# Patient Record
Sex: FEMALE | Race: WHITE | NOT HISPANIC OR LATINO | URBAN - METROPOLITAN AREA
[De-identification: names, ages, dates, MRNs, and addresses within clinical notes are randomized per-mention and may not be internally consistent; named-entity substitution may affect disease eponyms.]

---

## 2015-10-17 RX ORDER — ASPIRIN/CALCIUM CARB/MAGNESIUM 324 MG
2 TABLET ORAL
Qty: 0 | Refills: 0 | COMMUNITY
Start: 2015-10-17

## 2017-03-16 ENCOUNTER — EMERGENCY (EMERGENCY)
Facility: HOSPITAL | Age: 67
LOS: 1 days | Discharge: HOME | End: 2017-03-16
Attending: EMERGENCY MEDICINE | Admitting: EMERGENCY MEDICINE
Payer: MEDICARE

## 2017-03-16 VITALS
TEMPERATURE: 98 F | RESPIRATION RATE: 16 BRPM | DIASTOLIC BLOOD PRESSURE: 80 MMHG | HEART RATE: 89 BPM | OXYGEN SATURATION: 97 % | SYSTOLIC BLOOD PRESSURE: 138 MMHG

## 2017-03-16 VITALS
DIASTOLIC BLOOD PRESSURE: 90 MMHG | HEIGHT: 62 IN | RESPIRATION RATE: 16 BRPM | HEART RATE: 103 BPM | TEMPERATURE: 98 F | SYSTOLIC BLOOD PRESSURE: 178 MMHG | WEIGHT: 115.08 LBS | OXYGEN SATURATION: 99 %

## 2017-03-16 DIAGNOSIS — J45.909 UNSPECIFIED ASTHMA, UNCOMPLICATED: ICD-10-CM

## 2017-03-16 DIAGNOSIS — D24.2 BENIGN NEOPLASM OF LEFT BREAST: Chronic | ICD-10-CM

## 2017-03-16 DIAGNOSIS — M79.662 PAIN IN LEFT LOWER LEG: ICD-10-CM

## 2017-03-16 DIAGNOSIS — Z79.82 LONG TERM (CURRENT) USE OF ASPIRIN: ICD-10-CM

## 2017-03-16 DIAGNOSIS — E78.5 HYPERLIPIDEMIA, UNSPECIFIED: ICD-10-CM

## 2017-03-16 DIAGNOSIS — Z90.710 ACQUIRED ABSENCE OF BOTH CERVIX AND UTERUS: Chronic | ICD-10-CM

## 2017-03-16 DIAGNOSIS — G47.33 OBSTRUCTIVE SLEEP APNEA (ADULT) (PEDIATRIC): ICD-10-CM

## 2017-03-16 DIAGNOSIS — I10 ESSENTIAL (PRIMARY) HYPERTENSION: ICD-10-CM

## 2017-03-16 PROCEDURE — 93971 EXTREMITY STUDY: CPT

## 2017-03-16 PROCEDURE — 93971 EXTREMITY STUDY: CPT | Mod: 26,LT

## 2017-03-16 PROCEDURE — 99284 EMERGENCY DEPT VISIT MOD MDM: CPT

## 2017-03-16 PROCEDURE — 99284 EMERGENCY DEPT VISIT MOD MDM: CPT | Mod: 25

## 2017-03-16 NOTE — ED ADULT TRIAGE NOTE - CHIEF COMPLAINT QUOTE
"I've had swelling down my left calf to my foot since yesterday. It's hot but it's not red. I've been taking aspirin" "I've had swelling down my left calf to my foot since yesterday. It's hot and painful but it's not red. I've been taking aspirin"

## 2017-03-16 NOTE — ED ADULT NURSE NOTE - CHIEF COMPLAINT QUOTE
"I've had swelling down my left calf to my foot since yesterday. It's hot but it's not red. I've been taking aspirin"

## 2017-03-16 NOTE — ED PROVIDER NOTE - EXTREMITY EXAM
no deformity, pain or tenderness, no restriction of movement no deformity, pain or tenderness, no restriction of movement/left lower extremity findings

## 2017-03-16 NOTE — ED ADULT NURSE NOTE - OBJECTIVE STATEMENT
Pt is C/O swelling, pain, warm & intermittent numbness to left calf beginning yesterday. Pt denies known injury or Hx DVT. No erythema noted. Pt reports taking 650 mg ASA last night & this AM

## 2017-03-16 NOTE — ED ADULT NURSE NOTE - PMH
Asthma  last attack 3 weeks ago. Gets attack once per month  HLD (hyperlipidemia)    HTN (hypertension)    Osteoarthrosis  right shoulder  Sleep apnea, obstructive  uses mouth guard  Tumor cells, benign  uterine

## 2017-03-16 NOTE — ED PROVIDER NOTE - ATTENDING CONTRIBUTION TO CARE
65 yo F p/w L calf pain x past ~ 2 days. No numbness / focal weakness. NO known trauma. No redness / swelling. no rad to upper leg or back. No cp/sob/palp. NO neck or back discomfort. No color changes. No other inj or co.  Exam : mild tend to L calf, no edema. No erythema. 2+ pulses. nl cap refill. Nl dist str/sens equal bl. nl rest of exam.  US with no acute, outpt fu with pmd

## 2017-03-16 NOTE — ED ADULT NURSE NOTE - PSH
Fibroadenoma of breast, left  fibroadenoma removal  S/P hysterectomy with oophorectomy  ovarian cyst

## 2017-04-14 ENCOUNTER — TRANSCRIPTION ENCOUNTER (OUTPATIENT)
Age: 67
End: 2017-04-14

## 2017-09-27 ENCOUNTER — TRANSCRIPTION ENCOUNTER (OUTPATIENT)
Age: 67
End: 2017-09-27

## 2017-09-27 ENCOUNTER — OUTPATIENT (OUTPATIENT)
Dept: OUTPATIENT SERVICES | Facility: HOSPITAL | Age: 67
LOS: 1 days | Discharge: HOME | End: 2017-09-27
Payer: MEDICARE

## 2017-09-27 ENCOUNTER — RESULT REVIEW (OUTPATIENT)
Age: 67
End: 2017-09-27

## 2017-09-27 DIAGNOSIS — D24.2 BENIGN NEOPLASM OF LEFT BREAST: Chronic | ICD-10-CM

## 2017-09-27 DIAGNOSIS — Z90.710 ACQUIRED ABSENCE OF BOTH CERVIX AND UTERUS: Chronic | ICD-10-CM

## 2017-09-27 DIAGNOSIS — Z12.11 ENCOUNTER FOR SCREENING FOR MALIGNANT NEOPLASM OF COLON: ICD-10-CM

## 2017-09-27 PROCEDURE — 88305 TISSUE EXAM BY PATHOLOGIST: CPT

## 2017-09-27 PROCEDURE — 45381 COLONOSCOPY SUBMUCOUS NJX: CPT | Mod: PT

## 2017-09-27 PROCEDURE — 88305 TISSUE EXAM BY PATHOLOGIST: CPT | Mod: 26

## 2017-09-27 PROCEDURE — 45380 COLONOSCOPY AND BIOPSY: CPT | Mod: PT

## 2017-09-28 LAB — SURGICAL PATHOLOGY FINAL REPORT - CH: SIGNIFICANT CHANGE UP

## 2017-09-30 DIAGNOSIS — Z12.11 ENCOUNTER FOR SCREENING FOR MALIGNANT NEOPLASM OF COLON: ICD-10-CM

## 2017-09-30 DIAGNOSIS — J45.909 UNSPECIFIED ASTHMA, UNCOMPLICATED: ICD-10-CM

## 2017-09-30 DIAGNOSIS — E78.00 PURE HYPERCHOLESTEROLEMIA, UNSPECIFIED: ICD-10-CM

## 2017-09-30 DIAGNOSIS — K64.8 OTHER HEMORRHOIDS: ICD-10-CM

## 2017-09-30 DIAGNOSIS — I10 ESSENTIAL (PRIMARY) HYPERTENSION: ICD-10-CM

## 2017-09-30 DIAGNOSIS — G47.33 OBSTRUCTIVE SLEEP APNEA (ADULT) (PEDIATRIC): ICD-10-CM

## 2017-09-30 DIAGNOSIS — D12.0 BENIGN NEOPLASM OF CECUM: ICD-10-CM

## 2017-09-30 DIAGNOSIS — K57.30 DIVERTICULOSIS OF LARGE INTESTINE WITHOUT PERFORATION OR ABSCESS WITHOUT BLEEDING: ICD-10-CM

## 2017-12-21 ENCOUNTER — TRANSCRIPTION ENCOUNTER (OUTPATIENT)
Age: 67
End: 2017-12-21

## 2018-04-24 ENCOUNTER — TRANSCRIPTION ENCOUNTER (OUTPATIENT)
Age: 68
End: 2018-04-24

## 2019-12-09 ENCOUNTER — TRANSCRIPTION ENCOUNTER (OUTPATIENT)
Age: 69
End: 2019-12-09

## 2019-12-10 ENCOUNTER — OUTPATIENT (OUTPATIENT)
Dept: OUTPATIENT SERVICES | Facility: HOSPITAL | Age: 69
LOS: 1 days | End: 2019-12-10
Payer: MEDICARE

## 2019-12-10 ENCOUNTER — RESULT REVIEW (OUTPATIENT)
Age: 69
End: 2019-12-10

## 2019-12-10 DIAGNOSIS — Z86.010 PERSONAL HISTORY OF COLONIC POLYPS: ICD-10-CM

## 2019-12-10 DIAGNOSIS — D24.2 BENIGN NEOPLASM OF LEFT BREAST: Chronic | ICD-10-CM

## 2019-12-10 DIAGNOSIS — Z90.710 ACQUIRED ABSENCE OF BOTH CERVIX AND UTERUS: Chronic | ICD-10-CM

## 2019-12-10 PROCEDURE — 45385 COLONOSCOPY W/LESION REMOVAL: CPT | Mod: PT

## 2019-12-10 PROCEDURE — C1889: CPT

## 2019-12-10 PROCEDURE — 88305 TISSUE EXAM BY PATHOLOGIST: CPT | Mod: 26

## 2019-12-10 PROCEDURE — 88305 TISSUE EXAM BY PATHOLOGIST: CPT

## 2019-12-11 LAB — SURGICAL PATHOLOGY STUDY: SIGNIFICANT CHANGE UP

## 2021-03-16 NOTE — ED ADULT NURSE NOTE - BREATH SOUNDS, MLM
Clear Glycopyrrolate Counseling:  I discussed with the patient the risks of glycopyrrolate including but not limited to skin rash, drowsiness, dry mouth, difficulty urinating, and blurred vision.

## 2021-08-17 ENCOUNTER — OUTPATIENT (OUTPATIENT)
Dept: OUTPATIENT SERVICES | Facility: HOSPITAL | Age: 71
LOS: 1 days | End: 2021-08-17
Payer: MEDICARE

## 2021-08-17 ENCOUNTER — APPOINTMENT (OUTPATIENT)
Dept: ULTRASOUND IMAGING | Facility: CLINIC | Age: 71
End: 2021-08-17
Payer: MEDICARE

## 2021-08-17 DIAGNOSIS — Z90.710 ACQUIRED ABSENCE OF BOTH CERVIX AND UTERUS: Chronic | ICD-10-CM

## 2021-08-17 DIAGNOSIS — D24.2 BENIGN NEOPLASM OF LEFT BREAST: Chronic | ICD-10-CM

## 2021-08-17 DIAGNOSIS — Z00.8 ENCOUNTER FOR OTHER GENERAL EXAMINATION: ICD-10-CM

## 2021-08-17 PROCEDURE — 76770 US EXAM ABDO BACK WALL COMP: CPT | Mod: 26

## 2021-08-17 PROCEDURE — 76770 US EXAM ABDO BACK WALL COMP: CPT

## 2022-08-04 ENCOUNTER — NON-APPOINTMENT (OUTPATIENT)
Age: 72
End: 2022-08-04

## 2023-04-18 ENCOUNTER — APPOINTMENT (OUTPATIENT)
Dept: OTOLARYNGOLOGY | Facility: CLINIC | Age: 73
End: 2023-04-18
Payer: MEDICARE

## 2023-04-18 VITALS
DIASTOLIC BLOOD PRESSURE: 76 MMHG | SYSTOLIC BLOOD PRESSURE: 166 MMHG | WEIGHT: 106 LBS | HEART RATE: 101 BPM | BODY MASS INDEX: 19.51 KG/M2 | HEIGHT: 62 IN

## 2023-04-18 DIAGNOSIS — J34.2 DEVIATED NASAL SEPTUM: ICD-10-CM

## 2023-04-18 DIAGNOSIS — J38.2 NODULES OF VOCAL CORDS: ICD-10-CM

## 2023-04-18 DIAGNOSIS — M35.00 SICCA SYNDROME, UNSPECIFIED: ICD-10-CM

## 2023-04-18 DIAGNOSIS — H61.22 IMPACTED CERUMEN, LEFT EAR: ICD-10-CM

## 2023-04-18 DIAGNOSIS — J11.1 INFLUENZA DUE TO UNIDENTIFIED INFLUENZA VIRUS WITH OTHER RESPIRATORY MANIFESTATIONS: ICD-10-CM

## 2023-04-18 DIAGNOSIS — J45.909 UNSPECIFIED ASTHMA, UNCOMPLICATED: ICD-10-CM

## 2023-04-18 PROCEDURE — 99204 OFFICE O/P NEW MOD 45 MIN: CPT | Mod: 25

## 2023-04-18 PROCEDURE — 31575 DIAGNOSTIC LARYNGOSCOPY: CPT | Mod: 79

## 2023-04-18 PROCEDURE — 69210 REMOVE IMPACTED EAR WAX UNI: CPT | Mod: LT

## 2023-04-18 RX ORDER — METHYLPREDNISOLONE 4 MG/1
4 TABLET ORAL
Qty: 1 | Refills: 0 | Status: ACTIVE | COMMUNITY
Start: 2023-04-18 | End: 1900-01-01

## 2023-04-18 NOTE — ASSESSMENT
[FreeTextEntry1] : Reviewed and reconciled medications, allergies, PMHx, PSHx, SocHx, FMHx \par \par Patient presents stating that the week before Easter her family came from [place] and bought a really bad cold with them in which she got 8 days ago. She states she is coughing all night and Dr. RAVI gave her ABX and a cough medicine which didn’t help her. She states she has central apnea - uses the machine, asthma, and Sjogren's disease. Patient states Dr. RAVI referred her here because he thinks her laryngitis has been going on for too long. \par \par Physical Exam:\par -Left Ear: cerumen removed via curettage and suction\par -deviated septum left\par -inflamed turbinates bilaterally\par -tonsils class 1\par \par Flexible Laryngoscopy:\par -tiny nodules on vocal cords very anterior\par \par Plan: Flexible Laryngoscopy. Start Methylprednisolone. If doesn’t get better with the steroid give us a call

## 2023-04-18 NOTE — HISTORY OF PRESENT ILLNESS
[de-identified] : Patient presents stating that the week before Deept[place] her family came from [place] and bought a really bad cold with them in which she got 8 days ago. She states she is coughing all night and Dr. RAVI gave her ABX and a cough medicine which didn’t help her. She states she has central apnea - uses the machine, asthma, and Sjogren's disease. Patient states Dr. RAVI referred her here because he thinks her laryngitis has been going on for too long.

## 2023-04-18 NOTE — PHYSICAL EXAM
[] : septum deviated to the left [Midline] : trachea located in midline position [Normal] : no neck adenopathy [FreeTextEntry9] : cerumen removed via curettage and suction [de-identified] : class 1 [de-identified] : inflamed turbinates bilaterally

## 2023-04-18 NOTE — CONSULT LETTER
[Dear  ___] : Dear  [unfilled], [Consult Letter:] : I had the pleasure of evaluating your patient, [unfilled]. [Please see my note below.] : Please see my note below. [Consult Closing:] : Thank you very much for allowing me to participate in the care of this patient.  If you have any questions, please do not hesitate to contact me. [Sincerely,] : Sincerely, [FreeTextEntry1] : Declan Barnes MD FACS

## 2023-04-18 NOTE — PROCEDURE
[Complicated Symptoms] : complicated symptoms requiring more thorough examination than provided by mirror [de-identified] : Flexible Laryngoscopy:\par -tiny nodules on vocal cords very anterior\par

## 2024-12-13 NOTE — ED PROVIDER NOTE - PSYCHIATRIC NEGATIVE STATEMENT, MLM
Addended by: JANNY HOUSER on: 12/13/2024 12:07 PM     Modules accepted: Orders     no known mental health issues.

## 2025-07-07 ENCOUNTER — TRANSCRIPTION ENCOUNTER (OUTPATIENT)
Age: 75
End: 2025-07-07

## 2025-08-26 ENCOUNTER — APPOINTMENT (OUTPATIENT)
Dept: GASTROENTEROLOGY | Facility: CLINIC | Age: 75
End: 2025-08-26